# Patient Record
Sex: MALE | Race: WHITE | NOT HISPANIC OR LATINO | Employment: FULL TIME | URBAN - METROPOLITAN AREA
[De-identification: names, ages, dates, MRNs, and addresses within clinical notes are randomized per-mention and may not be internally consistent; named-entity substitution may affect disease eponyms.]

---

## 2018-10-02 ENCOUNTER — OFFICE VISIT (OUTPATIENT)
Dept: FAMILY MEDICINE | Facility: CLINIC | Age: 33
End: 2018-10-02
Payer: COMMERCIAL

## 2018-10-02 VITALS
SYSTOLIC BLOOD PRESSURE: 124 MMHG | HEIGHT: 69 IN | DIASTOLIC BLOOD PRESSURE: 72 MMHG | HEART RATE: 63 BPM | WEIGHT: 187.88 LBS | BODY MASS INDEX: 27.83 KG/M2 | OXYGEN SATURATION: 99 % | TEMPERATURE: 97 F

## 2018-10-02 DIAGNOSIS — R53.83 FATIGUE, UNSPECIFIED TYPE: ICD-10-CM

## 2018-10-02 DIAGNOSIS — A04.8 H. PYLORI INFECTION: Primary | ICD-10-CM

## 2018-10-02 PROCEDURE — 99204 OFFICE O/P NEW MOD 45 MIN: CPT | Mod: ,,, | Performed by: FAMILY MEDICINE

## 2018-10-02 PROCEDURE — 3008F BODY MASS INDEX DOCD: CPT | Mod: ,,, | Performed by: FAMILY MEDICINE

## 2018-10-02 RX ORDER — PANTOPRAZOLE SODIUM 20 MG/1
20 TABLET, DELAYED RELEASE ORAL DAILY
Qty: 30 TABLET | Refills: 11 | Status: SHIPPED | OUTPATIENT
Start: 2018-10-02 | End: 2019-11-01 | Stop reason: SDUPTHER

## 2018-10-02 NOTE — PROGRESS NOTES
SUBJECTIVE:    Patient ID: Tera Antonio is a 33 y.o. male.    Chief Complaint: Annual Exam (requesting labs include testostrone); Fatigue; and gas and heartburn    33-year-old male new to this provider prevents to clinic today for an annual exam, and to establish care with a new provider no past medical records to review. Patient today presents complaining of fatigue he thinks he has low testosterone (high previously been giving himself testosterone injections that were not prescribed), he also relates having heartburn.    Fatigue: Patient states that he has a lot of sleepiness he can lay down at any point and sleep. His normal bedtime is midnight he wakes up at 6 AM, he works all day comes home patient states if he lays down he can fall asleep but then he goes to the gym at 7:00 at night for which she takes a pre-workout supplement that involves stimulants he works out for 2 hours comes home after 8:00 at night and does not go to bed until midnight. Patient's complaint of fatigue sounds more like sleep deprivation is not complaining of daytime sleepiness while at work or driving, he's not complaining of any weakness, discussed sleep hygiene. Patient is concerned about his testosterone level because  he has done several cycles of anabolic steroids, and now thinks his testosterone level may be low because of that.    Patient previously treated for H. pylori, I review his labs showed a positive IgG several years ago he states he took medications symptoms resolved but now returned, he's never been tested for cure for H. pylori stool antigen, patient states after each breakfast when he gets to work he often has heartburn and abdominal pain, patient also relates sometimes waking up in the morning with abdominal pain and a funny taste in his mouth. Patient denies any melena or hematochezia.    Fatigue   This is a new problem. The current episode started more than 1 month ago. The problem occurs daily. The problem  has been unchanged. Associated symptoms include fatigue. Pertinent negatives include no abdominal pain, anorexia, arthralgias, change in bowel habit, chest pain, chills, congestion, coughing, diaphoresis, fever, headaches, joint swelling, myalgias, nausea, neck pain, numbness, rash, sore throat, swollen glands, urinary symptoms, vertigo, visual change, vomiting or weakness. Nothing aggravates the symptoms. He has tried nothing for the symptoms.   Gastroesophageal Reflux   He complains of belching and heartburn. He reports no abdominal pain, no chest pain, no choking, no coughing, no dysphagia, no early satiety, no globus sensation, no hoarse voice, no nausea, no sore throat, no stridor, no tooth decay, no water brash or no wheezing. This is a chronic problem. The current episode started more than 1 year ago. The problem occurs frequently. The problem has been unchanged. The heartburn duration is several minutes. The heartburn is located in the substernum and abdomen. The heartburn is of moderate intensity. The heartburn does not wake him from sleep. The heartburn does not limit his activity. The heartburn doesn't change with position. The symptoms are aggravated by caffeine, certain foods and lying down. Associated symptoms include fatigue. Pertinent negatives include no anemia, melena, muscle weakness, orthopnea or weight loss. He has tried an antacid and an antibiotic for the symptoms. The treatment provided mild relief.     History reviewed. No pertinent past medical history.  Social History     Socioeconomic History    Marital status:      Spouse name: Not on file    Number of children: Not on file    Years of education: Not on file    Highest education level: Not on file   Social Needs    Financial resource strain: Not on file    Food insecurity - worry: Not on file    Food insecurity - inability: Not on file    Transportation needs - medical: Not on file    Transportation needs - non-medical:  Not on file   Occupational History    Not on file   Tobacco Use    Smoking status: Never Smoker    Smokeless tobacco: Never Used   Substance and Sexual Activity    Alcohol use: No     Frequency: Never    Drug use: No    Sexual activity: Yes     Partners: Female   Other Topics Concern    Not on file   Social History Narrative    Not on file     History reviewed. No pertinent surgical history.  Family History   Problem Relation Age of Onset    No Known Problems Mother     Heart disease Father     Hyperlipidemia Father     Hypertension Father      Current Outpatient Medications   Medication Sig Dispense Refill    pantoprazole (PROTONIX) 20 MG tablet Take 1 tablet (20 mg total) by mouth once daily. 30 tablet 11     No current facility-administered medications for this visit.      Review of patient's allergies indicates:   Allergen Reactions    Eggs [egg derived] Itching       Review of Systems   Constitutional: Positive for fatigue. Negative for activity change, appetite change, chills, diaphoresis, fever, unexpected weight change and weight loss.   HENT: Negative for congestion, dental problem, ear pain, hoarse voice, postnasal drip, rhinorrhea, sinus pressure, sinus pain, sore throat, trouble swallowing and voice change.    Eyes: Negative for pain, discharge and itching.   Respiratory: Negative for cough, choking, chest tightness, shortness of breath and wheezing.    Cardiovascular: Negative for chest pain and palpitations.   Gastrointestinal: Positive for heartburn. Negative for abdominal distention, abdominal pain, anorexia, blood in stool, change in bowel habit, constipation, diarrhea, dysphagia, melena, nausea, rectal pain and vomiting.   Genitourinary: Negative for difficulty urinating, enuresis, hematuria and urgency.   Musculoskeletal: Negative for arthralgias, joint swelling, myalgias, muscle weakness and neck pain.   Skin: Negative for rash.   Neurological: Negative for vertigo, weakness,  "numbness and headaches.          Blood pressure 124/72, pulse 63, temperature 97.2 °F (36.2 °C), height 5' 9" (1.753 m), weight 85.2 kg (187 lb 14.4 oz), SpO2 99 %. Body mass index is 27.75 kg/m².   Objective:      Physical Exam   Constitutional: He is oriented to person, place, and time. He appears well-developed and well-nourished. No distress.   HENT:   Head: Normocephalic and atraumatic.   Right Ear: External ear normal.   Left Ear: External ear normal.   Mouth/Throat: Oropharynx is clear and moist.   Eyes: Conjunctivae and EOM are normal. Pupils are equal, round, and reactive to light. Right eye exhibits no discharge. Left eye exhibits no discharge. No scleral icterus.   Cardiovascular: Normal rate, regular rhythm and normal heart sounds.   No murmur heard.  Pulmonary/Chest: Effort normal and breath sounds normal. No respiratory distress. He has no wheezes. He has no rales.   Abdominal: Soft. Bowel sounds are normal. He exhibits no distension. There is no tenderness. There is no guarding.   Neurological: He is alert and oriented to person, place, and time.   Skin: Skin is warm and dry. Capillary refill takes less than 2 seconds. No rash noted.   Psychiatric: He has a normal mood and affect.           Assessment:       1. H. pylori infection    2. Fatigue, unspecified type         Plan:           H. pylori infection  -     H. pylori antigen, stool; Future; Expected date: 10/02/2018  -     pantoprazole (PROTONIX) 20 MG tablet; Take 1 tablet (20 mg total) by mouth once daily.  Dispense: 30 tablet; Refill: 11  Previously diagnosed with H. pylori infection, patient not currently taking any medications, has not been tested for cure will get stool antigen to evaluate for possible reinfection with H. pylori or a complete treatment. Will start patient on Protonix 20 mg a day patient started taking daily to see if this resolves his heartburn symptoms while rechecking for his H. pylori. With a history of H. pylori and " continue symptoms if he returns symptomatic we'll consider upper GI    Fatigue, unspecified type  -     Testosterone, Total, Males; Future; Expected date: 10/02/2018  Patient with complaints of fatigue and some more like sleep deprivation instructed patient to try to get better earlier, instructed good sleep hygiene, recommend against using pre-workout if he is in a workout that late afternoon to help him sleep better, counseled against nonprescribed testosterone products, will check his testosterone informed him will not be replacing testosterone and less is out of range.

## 2018-10-03 LAB — TESTOST SERPL-MCNC: 593 NG/DL (ref 264–916)

## 2019-08-07 ENCOUNTER — HOSPITAL ENCOUNTER (EMERGENCY)
Facility: HOSPITAL | Age: 34
Discharge: HOME | End: 2019-08-07
Attending: EMERGENCY MEDICINE
Payer: COMMERCIAL

## 2019-08-07 VITALS
HEART RATE: 91 BPM | WEIGHT: 190 LBS | SYSTOLIC BLOOD PRESSURE: 147 MMHG | DIASTOLIC BLOOD PRESSURE: 98 MMHG | BODY MASS INDEX: 28.14 KG/M2 | OXYGEN SATURATION: 98 % | RESPIRATION RATE: 20 BRPM | TEMPERATURE: 98 F | HEIGHT: 69 IN

## 2019-08-07 DIAGNOSIS — N20.0 KIDNEY STONE: Primary | ICD-10-CM

## 2019-08-07 LAB
BACTERIA #/AREA URNS HPF: ABNORMAL /HPF
BILIRUB UR QL STRIP: NEGATIVE
CLARITY UR: CLEAR
COLOR UR: YELLOW
GLUCOSE UR QL STRIP: NEGATIVE
HGB UR QL STRIP: ABNORMAL
KETONES UR QL STRIP: NEGATIVE
LEUKOCYTE ESTERASE UR QL STRIP: NEGATIVE
MICROSCOPIC COMMENT: ABNORMAL
NITRITE UR QL STRIP: NEGATIVE
PH UR STRIP: 6 [PH] (ref 5–8)
PROT UR QL STRIP: ABNORMAL
RBC #/AREA URNS HPF: 7 /HPF (ref 0–4)
SP GR UR STRIP: >=1.03 (ref 1–1.03)
SQUAMOUS #/AREA URNS HPF: ABNORMAL /HPF
URN SPEC COLLECT METH UR: ABNORMAL
UROBILINOGEN UR STRIP-ACNC: NEGATIVE EU/DL
WBC #/AREA URNS HPF: 2 /HPF (ref 0–5)

## 2019-08-07 PROCEDURE — 74176 CT RENAL STONE STUDY ABD PELVIS WO: ICD-10-PCS | Mod: 26,,, | Performed by: RADIOLOGY

## 2019-08-07 PROCEDURE — 74176 CT ABD & PELVIS W/O CONTRAST: CPT | Mod: 26,,, | Performed by: RADIOLOGY

## 2019-08-07 PROCEDURE — 63600175 PHARM REV CODE 636 W HCPCS: Performed by: NURSE PRACTITIONER

## 2019-08-07 PROCEDURE — 81000 URINALYSIS NONAUTO W/SCOPE: CPT

## 2019-08-07 PROCEDURE — 74176 CT ABD & PELVIS W/O CONTRAST: CPT | Mod: TC

## 2019-08-07 PROCEDURE — 96372 THER/PROPH/DIAG INJ SC/IM: CPT

## 2019-08-07 PROCEDURE — 99284 EMERGENCY DEPT VISIT MOD MDM: CPT | Mod: 25

## 2019-08-07 RX ORDER — KETOROLAC TROMETHAMINE 10 MG/1
10 TABLET, FILM COATED ORAL EVERY 6 HOURS PRN
Qty: 25 TABLET | Refills: 0 | Status: SHIPPED | OUTPATIENT
Start: 2019-08-07 | End: 2019-08-17

## 2019-08-07 RX ORDER — KETOROLAC TROMETHAMINE 30 MG/ML
60 INJECTION, SOLUTION INTRAMUSCULAR; INTRAVENOUS
Status: COMPLETED | OUTPATIENT
Start: 2019-08-07 | End: 2019-08-07

## 2019-08-07 RX ORDER — HYDROCODONE BITARTRATE AND ACETAMINOPHEN 5; 325 MG/1; MG/1
1 TABLET ORAL EVERY 4 HOURS PRN
Qty: 10 TABLET | Refills: 0 | Status: SHIPPED | OUTPATIENT
Start: 2019-08-07 | End: 2019-08-10

## 2019-08-07 RX ORDER — MORPHINE SULFATE 4 MG/ML
4 INJECTION, SOLUTION INTRAMUSCULAR; INTRAVENOUS
Status: DISCONTINUED | OUTPATIENT
Start: 2019-08-07 | End: 2019-08-07 | Stop reason: HOSPADM

## 2019-08-07 RX ADMIN — KETOROLAC TROMETHAMINE 60 MG: 30 INJECTION, SOLUTION INTRAMUSCULAR; INTRAVENOUS at 02:08

## 2019-08-07 NOTE — ED NOTES
Pt d/c and medication instructions reviewed and pt verbalized understanding. Pt ambulatory at d/c and escorted to registration.

## 2019-08-13 NOTE — ED PROVIDER NOTES
Encounter Date: 8/7/2019       History     Chief Complaint   Patient presents with    Flank Pain     left     Tera Antonio is a 34 y.o male with PMHx including GERD. He presents to ED with complaint of sudden onset left flank pain since last night    Patient reports gradual onset left flank pain. He now reports sharp, sever pain to left flank. Pain does not radiate to abdomin    He denies abdominal pain. No N/V/D. He is tolerating fluids.     No fever, body aches or chills    No urinary symptoms or hematuria              Review of patient's allergies indicates:   Allergen Reactions    Eggs [egg derived] Itching     Past Medical History:   Diagnosis Date    GERD (gastroesophageal reflux disease)      History reviewed. No pertinent surgical history.  Family History   Problem Relation Age of Onset    No Known Problems Mother     Heart disease Father     Hyperlipidemia Father     Hypertension Father      Social History     Tobacco Use    Smoking status: Never Smoker    Smokeless tobacco: Never Used   Substance Use Topics    Alcohol use: No     Frequency: Never    Drug use: No     Review of Systems   Constitutional: Negative.  Negative for fever.   HENT: Negative.  Negative for sore throat.    Eyes: Negative.    Respiratory: Negative.  Negative for shortness of breath.    Cardiovascular: Negative.  Negative for chest pain.   Gastrointestinal: Negative for nausea.   Endocrine: Negative.    Genitourinary: Positive for flank pain. Negative for decreased urine volume, discharge, dysuria, frequency, penile pain, penile swelling, scrotal swelling, testicular pain and urgency.   Musculoskeletal: Negative for back pain.   Skin: Negative for rash.   Neurological: Negative.  Negative for weakness.   Hematological: Negative.  Does not bruise/bleed easily.   Psychiatric/Behavioral: Negative.    All other systems reviewed and are negative.      Physical Exam     Initial Vitals [08/07/19 1359]   BP Pulse Resp Temp SpO2    (!) 147/98 91 20 97.9 °F (36.6 °C) 98 %      MAP       --         Physical Exam    Nursing note and vitals reviewed.  Constitutional: He appears well-developed and well-nourished.   HENT:   Head: Normocephalic.   Eyes: Pupils are equal, round, and reactive to light.   Neck: Normal range of motion.   Cardiovascular: Normal rate and regular rhythm.   Pulmonary/Chest: Breath sounds normal.   Abdominal: Soft. Bowel sounds are normal. He exhibits no distension. There is no tenderness. There is no rebound and no guarding.   Neurological: He is alert and oriented to person, place, and time.   Skin: Skin is warm and dry.   Psychiatric: He has a normal mood and affect. His behavior is normal. Judgment and thought content normal.         ED Course   Procedures  Labs Reviewed   URINALYSIS, REFLEX TO URINE CULTURE - Abnormal; Notable for the following components:       Result Value    Specific Gravity, UA >=1.030 (*)     Protein, UA Trace (*)     Occult Blood UA 2+ (*)     All other components within normal limits    Narrative:     Preferred Collection Type->Urine, Clean Catch   URINALYSIS MICROSCOPIC - Abnormal; Notable for the following components:    RBC, UA 7 (*)     All other components within normal limits    Narrative:     Preferred Collection Type->Urine, Clean Catch          Imaging Results          CT Renal Stone Study ABD Pelvis WO (Final result)  Result time 08/07/19 14:55:58    Final result by Hunter Beebe MD (08/07/19 14:55:58)                 Impression:      1. Small nonobstructing right renal calculus.  2. Small fat containing umbilical hernia.      Electronically signed by: Hunter Beebe  Date:    08/07/2019  Time:    14:55             Narrative:    EXAMINATION:  CT RENAL STONE STUDY ABD PELVIS WO    CLINICAL HISTORY:  Flank pain, stone disease suspected;    TECHNIQUE:  Low dose axial images, sagittal and coronal reformations were obtained from the lung bases to the pubic symphysis.  Contrast was not  administered.    COMPARISON:  None    FINDINGS:  The lung bases are clear.  No pleural or pericardial effusions.    The liver and spleen are normal in size and attenuation.  The gallbladder, pancreas and adrenal glands are unremarkable.    Kidneys are normal in size and attenuation.  Small 4 mm nonobstructing right renal calculus.  No left renal calculi.  No changes of hydronephrosis.  No perinephric inflammatory change.    Air and stool throughout the loops of colon.  No mesenteric inflammatory change.  No CT evidence for bowel obstruction.  Appendix is normal in caliber.    The bladder is only partially distended.  Prostate, seminal vesicles and rectum unremarkable.    No significant mesenteric or retroperitoneal lymphadenopathy.    Small fat containing umbilical hernia.                                 Medical Decision Making:   Initial Assessment:   Patient with complaint of sudden onset left flank pain since last night    Patient reports gradual onset left flank pain. He now reports sharp, sever pain to left flank. Pain does not radiate to abdomin    He denies abdominal pain. No N/V/D. He is tolerating fluids.     No fever, body aches or chills    No urinary symptoms or hematuria          Differential Diagnosis:   UTI, STD, kidney stone, kidney infection  ED Management:  Urine with 2+ occult blood    CT abdomin with small non-obstructing right renal calculi    Discussed physical exam findings with patient  No acute emergent medical condition identified at this time to warrant further testing/diagnostics  At this time, I believe the patient is clinically stable for discharge.   Patient to follow up with PCP in 1-2 days.  The patient acknowledges that close follow up with a MD is required after all ER visits  Pt given instructions; take all medications prescribed in the ER as directed.   Patient agrees to comply with all instruction and direction given in the ER  Pt agrees to return to ER if any symptoms  reoccur                               Clinical Impression:       ICD-10-CM ICD-9-CM   1. Kidney stone N20.0 592.0                                Bobbi Santos NP  08/13/19 4642

## 2019-09-08 ENCOUNTER — HOSPITAL ENCOUNTER (EMERGENCY)
Facility: HOSPITAL | Age: 34
Discharge: HOME | End: 2019-09-08
Attending: EMERGENCY MEDICINE
Payer: COMMERCIAL

## 2019-09-08 VITALS
SYSTOLIC BLOOD PRESSURE: 147 MMHG | HEART RATE: 72 BPM | DIASTOLIC BLOOD PRESSURE: 83 MMHG | BODY MASS INDEX: 29.24 KG/M2 | OXYGEN SATURATION: 99 % | TEMPERATURE: 98 F | WEIGHT: 198 LBS | RESPIRATION RATE: 16 BRPM

## 2019-09-08 DIAGNOSIS — S91.339A PUNCTURE WOUND OF FOOT: ICD-10-CM

## 2019-09-08 PROCEDURE — 99284 EMERGENCY DEPT VISIT MOD MDM: CPT | Mod: 25

## 2019-09-08 PROCEDURE — 25000003 PHARM REV CODE 250: Performed by: EMERGENCY MEDICINE

## 2019-09-08 PROCEDURE — 90715 TDAP VACCINE 7 YRS/> IM: CPT | Performed by: EMERGENCY MEDICINE

## 2019-09-08 PROCEDURE — 63600175 PHARM REV CODE 636 W HCPCS: Performed by: EMERGENCY MEDICINE

## 2019-09-08 PROCEDURE — 90471 IMMUNIZATION ADMIN: CPT | Performed by: EMERGENCY MEDICINE

## 2019-09-08 RX ORDER — CIPROFLOXACIN 500 MG/1
500 TABLET ORAL 2 TIMES DAILY
Qty: 20 TABLET | Refills: 0 | Status: SHIPPED | OUTPATIENT
Start: 2019-09-08 | End: 2019-09-08 | Stop reason: SDUPTHER

## 2019-09-08 RX ORDER — CIPROFLOXACIN 500 MG/1
500 TABLET ORAL 2 TIMES DAILY
Qty: 20 TABLET | Refills: 0 | Status: SHIPPED | OUTPATIENT
Start: 2019-09-08 | End: 2019-09-18

## 2019-09-08 RX ORDER — CIPROFLOXACIN 500 MG/1
500 TABLET ORAL
Status: COMPLETED | OUTPATIENT
Start: 2019-09-08 | End: 2019-09-08

## 2019-09-08 RX ADMIN — CIPROFLOXACIN HYDROCHLORIDE 500 MG: 500 TABLET, FILM COATED ORAL at 08:09

## 2019-09-08 RX ADMIN — CLOSTRIDIUM TETANI TOXOID ANTIGEN (FORMALDEHYDE INACTIVATED), CORYNEBACTERIUM DIPHTHERIAE TOXOID ANTIGEN (FORMALDEHYDE INACTIVATED), BORDETELLA PERTUSSIS TOXOID ANTIGEN (GLUTARALDEHYDE INACTIVATED), BORDETELLA PERTUSSIS FILAMENTOUS HEMAGGLUTININ ANTIGEN (FORMALDEHYDE INACTIVATED), BORDETELLA PERTUSSIS PERTACTIN ANTIGEN, AND BORDETELLA PERTUSSIS FIMBRIAE 2/3 ANTIGEN 0.5 ML: 5; 2; 2.5; 5; 3; 5 INJECTION, SUSPENSION INTRAMUSCULAR at 08:09

## 2019-09-09 NOTE — ED NOTES
"Pt states he stepped on screws. He states he stepped on a screw went to hop off and stepped on another screw with the opposite foot. He hasn't had a tetanus shot in a long time. He states "years" and he was wearing flip flops when the incident happened. He has small pin size holes on the bottom of both feet. Pain 3/10  "

## 2019-09-09 NOTE — ED PROVIDER NOTES
Encounter Date: 9/8/2019       History 34-year-old male presents to the emergency department states that he was wearing flip-flop shoes when he stepped on screws to both of his feet.  Patient has puncture wounds to the plantar surface of bilateral feet last tetanus unknown.  Denies foreign body sensation     Chief Complaint   Patient presents with    Foot Injury     stepped on a screw both feet tetanus shot over 10 yrs     HPI  Review of patient's allergies indicates:  No Known Allergies  Past Medical History:   Diagnosis Date    GERD (gastroesophageal reflux disease)      No past surgical history on file.  Family History   Problem Relation Age of Onset    No Known Problems Mother     Heart disease Father     Hyperlipidemia Father     Hypertension Father      Social History     Tobacco Use    Smoking status: Never Smoker    Smokeless tobacco: Never Used   Substance Use Topics    Alcohol use: No     Frequency: Never    Drug use: No     Review of Systems   Constitutional: Negative.    HENT: Negative.    Respiratory: Negative.    Cardiovascular: Negative.    Gastrointestinal: Negative.    Genitourinary: Negative.    Musculoskeletal: Negative.    Skin: Positive for wound.   Hematological: Negative.    Psychiatric/Behavioral: Negative.        Physical Exam     Initial Vitals [09/08/19 1935]   BP Pulse Resp Temp SpO2   (!) 141/78 76 16 98.4 °F (36.9 °C) 97 %      MAP       --         Physical Exam    Nursing note and vitals reviewed.  Constitutional: He appears well-developed and well-nourished.   HENT:   Head: Normocephalic and atraumatic.   Eyes: EOM are normal. Pupils are equal, round, and reactive to light.   Cardiovascular: Normal rate and regular rhythm.   Pulmonary/Chest: Breath sounds normal.   Abdominal: Soft. Bowel sounds are normal.   Musculoskeletal:        Left foot: There is tenderness.        Feet:    Puncture wounds to plantar surface of both feet no evidence of erythema surrounding no foreign  body sensation no evidence of lymphangitis.   Neurological: He is alert and oriented to person, place, and time.   Skin: Skin is warm. Capillary refill takes less than 2 seconds.   Puncture wound to the plantar surface of the right and left foot   Psychiatric: He has a normal mood and affect.         ED Course   Procedures  Labs Reviewed - No data to display       Imaging Results    None          Medical Decision Making:   Initial Assessment:   Puncture wound  Differential Diagnosis:   Plantar puncture wound  ED Management:  34-year-old male status post stepping on a nail with flip-flops on to both bilateral plantar surface of feet x-ray images reviewed no foreign body noted tetanus updated patient will be placed on prophylactic antibiotics.  He was given detailed return precautions                      Clinical Impression:       ICD-10-CM ICD-9-CM   1. Puncture wound of foot S91.339A 892.0                                Yaa Amado, TELMA  09/08/19 2130

## 2019-09-09 NOTE — DISCHARGE INSTRUCTIONS
Take antibiotics as directed  Motrin for pain  Return for any concerns or signs of infection  Follow-up as directed

## 2019-11-01 DIAGNOSIS — A04.8 H. PYLORI INFECTION: ICD-10-CM

## 2019-11-04 RX ORDER — PANTOPRAZOLE SODIUM 20 MG/1
TABLET, DELAYED RELEASE ORAL
Qty: 30 TABLET | Refills: 11 | Status: SHIPPED | OUTPATIENT
Start: 2019-11-04 | End: 2020-06-01 | Stop reason: ALTCHOICE

## 2020-01-22 ENCOUNTER — TELEPHONE (OUTPATIENT)
Dept: FAMILY MEDICINE | Facility: CLINIC | Age: 35
End: 2020-01-22

## 2020-01-23 ENCOUNTER — TELEPHONE (OUTPATIENT)
Dept: FAMILY MEDICINE | Facility: CLINIC | Age: 35
End: 2020-01-23

## 2020-01-23 NOTE — TELEPHONE ENCOUNTER
[facility] [place] [facility] -   Reached out to patient regarding:  Colorectal screen, A1c, DM eye exam.  Had to [facility]

## 2020-06-01 ENCOUNTER — OFFICE VISIT (OUTPATIENT)
Dept: FAMILY MEDICINE | Facility: CLINIC | Age: 35
End: 2020-06-01
Payer: COMMERCIAL

## 2020-06-01 VITALS
TEMPERATURE: 98 F | HEIGHT: 69 IN | RESPIRATION RATE: 16 BRPM | WEIGHT: 187 LBS | DIASTOLIC BLOOD PRESSURE: 80 MMHG | OXYGEN SATURATION: 98 % | HEART RATE: 83 BPM | SYSTOLIC BLOOD PRESSURE: 130 MMHG | BODY MASS INDEX: 27.7 KG/M2

## 2020-06-01 DIAGNOSIS — R53.82 CHRONIC FATIGUE: Primary | ICD-10-CM

## 2020-06-01 DIAGNOSIS — K21.9 GASTROESOPHAGEAL REFLUX DISEASE WITHOUT ESOPHAGITIS: ICD-10-CM

## 2020-06-01 DIAGNOSIS — Z13.220 ENCOUNTER FOR LIPID SCREENING FOR CARDIOVASCULAR DISEASE: ICD-10-CM

## 2020-06-01 DIAGNOSIS — Z13.6 ENCOUNTER FOR LIPID SCREENING FOR CARDIOVASCULAR DISEASE: ICD-10-CM

## 2020-06-01 PROCEDURE — 99213 PR OFFICE/OUTPT VISIT, EST, LEVL III, 20-29 MIN: ICD-10-PCS | Mod: S$GLB,,, | Performed by: FAMILY MEDICINE

## 2020-06-01 PROCEDURE — 3008F PR BODY MASS INDEX (BMI) DOCUMENTED: ICD-10-PCS | Mod: S$GLB,,, | Performed by: FAMILY MEDICINE

## 2020-06-01 PROCEDURE — 3008F BODY MASS INDEX DOCD: CPT | Mod: S$GLB,,, | Performed by: FAMILY MEDICINE

## 2020-06-01 PROCEDURE — 99213 OFFICE O/P EST LOW 20 MIN: CPT | Mod: S$GLB,,, | Performed by: FAMILY MEDICINE

## 2020-06-01 RX ORDER — PANTOPRAZOLE SODIUM 40 MG/1
40 TABLET, DELAYED RELEASE ORAL DAILY
Qty: 30 TABLET | Refills: 11 | Status: SHIPPED | OUTPATIENT
Start: 2020-06-01 | End: 2021-01-11 | Stop reason: ALTCHOICE

## 2020-06-01 NOTE — PROGRESS NOTES
SUBJECTIVE:    Patient ID: Tera Antonio is a 35 y.o. male.    Chief Complaint: GI Problem (gas pain/upset stomach every morning) and Fatigue      36 yo male who has not been seen since 2018. He is here today complaining about having an upset stomach, funny taste in his mouth, for several months, patient states that he wakes up and his appetite is decreased. He has a hx of heart burn on 20mg pantoprazole, with has been treating his heart burn.    He is also complaining feeling chronically tired, he complained of the same issue 2 years ago. Pt states that he goes to bed around midnight and sleeps until 6am, he feels chronically tired and has not worked out in 6 months because of the fatigue. He states that he could fall asleep in the middle of the day if he is sitting too long.\    Smoke: None  ETOH: None  Exercise: Has not worked out in 6 months.    Fatigue   This is a chronic problem. The current episode started more than 1 month ago. The problem occurs daily. The problem has been unchanged. Associated symptoms include fatigue. Pertinent negatives include no abdominal pain, anorexia, arthralgias, change in bowel habit, chest pain, chills, congestion, coughing, diaphoresis, fever, headaches, joint swelling, myalgias, nausea, neck pain, numbness, rash, sore throat, swollen glands, urinary symptoms, vertigo, visual change, vomiting or weakness. Exacerbated by: lack of sleep. He has tried nothing for the symptoms. The treatment provided no relief.   Gastroesophageal Reflux   He reports no abdominal pain, no chest pain, no coughing, no nausea, no sore throat or no wheezing. This is a chronic problem. The current episode started more than 1 year ago. The problem occurs occasionally. The problem has been waxing and waning. Associated symptoms include fatigue. He has tried a PPI for the symptoms. The treatment provided mild relief.         Past Medical History:   Diagnosis Date    GERD (gastroesophageal reflux  disease)      Social History     Socioeconomic History    Marital status:      Spouse name: Not on file    Number of children: Not on file    Years of education: Not on file    Highest education level: Not on file   Occupational History    Not on file   Social Needs    Financial resource strain: Not on file    Food insecurity:     Worry: Not on file     Inability: Not on file    Transportation needs:     Medical: Not on file     Non-medical: Not on file   Tobacco Use    Smoking status: Never Smoker    Smokeless tobacco: Never Used   Substance and Sexual Activity    Alcohol use: No     Frequency: Never    Drug use: No    Sexual activity: Yes     Partners: Female   Lifestyle    Physical activity:     Days per week: Not on file     Minutes per session: Not on file    Stress: Only a little   Relationships    Social connections:     Talks on phone: Not on file     Gets together: Not on file     Attends Confucianist service: Not on file     Active member of club or organization: Not on file     Attends meetings of clubs or organizations: Not on file     Relationship status: Not on file   Other Topics Concern    Not on file   Social History Narrative    Not on file     History reviewed. No pertinent surgical history.  Family History   Problem Relation Age of Onset    No Known Problems Mother     Heart disease Father     Hyperlipidemia Father     Hypertension Father      Current Outpatient Medications   Medication Sig Dispense Refill    pantoprazole (PROTONIX) 40 MG tablet Take 1 tablet (40 mg total) by mouth once daily. 30 tablet 11     No current facility-administered medications for this visit.      Review of patient's allergies indicates:  No Known Allergies    Review of Systems   Constitutional: Positive for fatigue. Negative for chills, diaphoresis and fever.   HENT: Negative for congestion, sinus pressure, sinus pain, sneezing and sore throat.    Respiratory: Negative for cough, chest  "tightness, shortness of breath and wheezing.    Cardiovascular: Negative for chest pain, palpitations and leg swelling.   Gastrointestinal: Negative for abdominal distention, abdominal pain, anorexia, blood in stool, change in bowel habit, constipation, nausea and vomiting.   Musculoskeletal: Negative for arthralgias, joint swelling, myalgias and neck pain.   Skin: Negative for rash.   Neurological: Negative for vertigo, weakness, numbness and headaches.          Blood pressure 130/80, pulse 83, temperature 98 °F (36.7 °C), temperature source Oral, resp. rate 16, height 5' 9" (1.753 m), weight 84.8 kg (187 lb), SpO2 98 %. Body mass index is 27.62 kg/m².   Objective:      Physical Exam   Constitutional: He is oriented to person, place, and time. He appears well-developed and well-nourished. No distress.   HENT:   Head: Normocephalic and atraumatic.   Right Ear: External ear normal.   Left Ear: External ear normal.   Mouth/Throat: Oropharynx is clear and moist.   Eyes: Pupils are equal, round, and reactive to light. Conjunctivae are normal. Right eye exhibits no discharge. Left eye exhibits no discharge.   Neck: Normal range of motion. Neck supple. No thyromegaly present.   Cardiovascular: Normal rate, regular rhythm and normal heart sounds.   No murmur heard.  Pulmonary/Chest: Effort normal and breath sounds normal.   Lymphadenopathy:     He has no cervical adenopathy.   Neurological: He is alert and oriented to person, place, and time.   Skin: Skin is warm and dry. He is not diaphoretic.           Assessment:       1. Chronic fatigue    2. Encounter for lipid screening for cardiovascular disease    3. Gastroesophageal reflux disease without esophagitis         Plan:           Chronic fatigue  -     CBC auto differential; Future; Expected date: 06/01/2020  -     Cancel: Comprehensive metabolic panel; Future; Expected date: 06/01/2020  -     Cancel: TSH; Future; Expected date: 06/01/2020  -     Testosterone, Total, " Males; Future; Expected date: 06/01/2020  -     TSH; Future; Expected date: 06/01/2020  -     Comprehensive metabolic panel; Future; Expected date: 06/01/2020  Pt complaining of chronic fatigue I have asked that he attempt to get 8 hour of sleep routinly, (pt states when he gets 8 hours of sleep he feels more fatigued) I will get routine screening to look for other possible causes of fatigue.    Encounter for lipid screening for cardiovascular disease  -     Cancel: Lipid Panel; Future; Expected date: 06/01/2020  -     Lipid Panel; Future; Expected date: 06/01/2020  Routine    Gastroesophageal reflux disease without esophagitis  -     pantoprazole (PROTONIX) 40 MG tablet; Take 1 tablet (40 mg total) by mouth once daily.  Dispense: 30 tablet; Refill: 11  Will increase his pantoprazole to see if this resolves his morning symptoms.

## 2020-06-01 NOTE — LETTER
June 1, 2020      [facility]  [address]  [facility] [address]  Phone: [phone]  Fax: [phone]       Patient: Tera Antonio   YOB: 1985  Date of Visit: 06/01/2020    To Whom It May Concern:    Esha Antonio  was at [facility] on 06/01/2020. He may return to work/school on 06/02/2020 with no restrictions. Also, please excuse him on the following dates as well 05/29/2020 to 05/31/2020  If you have any questions or concerns, or if I can be of further assistance, please do not hesitate to contact me.    Sincerely,        CC: Anette pinto

## 2020-06-14 LAB
ALBUMIN SERPL-MCNC: 4.4 G/DL (ref 4–5)
ALBUMIN/GLOB SERPL: 1.6 {RATIO} (ref 1.2–2.2)
ALP SERPL-CCNC: 67 IU/L (ref 39–117)
ALT SERPL-CCNC: 17 IU/L (ref 0–44)
AST SERPL-CCNC: 20 IU/L (ref 0–40)
BASOPHILS # BLD AUTO: 0 X10E3/UL (ref 0–0.2)
BASOPHILS NFR BLD AUTO: 1 %
BILIRUB SERPL-MCNC: 0.5 MG/DL (ref 0–1.2)
BUN SERPL-MCNC: 14 MG/DL (ref 6–20)
BUN/CREAT SERPL: 14 (ref 9–20)
CALCIUM SERPL-MCNC: 9.3 MG/DL (ref 8.7–10.2)
CHLORIDE SERPL-SCNC: 102 MMOL/L (ref 96–106)
CHOLEST SERPL-MCNC: 196 MG/DL (ref 100–199)
CO2 SERPL-SCNC: 26 MMOL/L (ref 20–29)
CREAT SERPL-MCNC: 1.02 MG/DL (ref 0.76–1.27)
EOSINOPHIL # BLD AUTO: 0.1 X10E3/UL (ref 0–0.4)
EOSINOPHIL NFR BLD AUTO: 1 %
ERYTHROCYTE [DISTWIDTH] IN BLOOD BY AUTOMATED COUNT: 12 % (ref 11.6–15.4)
GLOBULIN SER CALC-MCNC: 2.7 G/DL (ref 1.5–4.5)
GLUCOSE SERPL-MCNC: 90 MG/DL (ref 65–99)
HCT VFR BLD AUTO: 44.4 % (ref 37.5–51)
HDLC SERPL-MCNC: 41 MG/DL
HGB BLD-MCNC: 15.3 G/DL (ref 13–17.7)
IMM GRANULOCYTES # BLD AUTO: 0 X10E3/UL (ref 0–0.1)
IMM GRANULOCYTES NFR BLD AUTO: 0 %
LDLC SERPL CALC-MCNC: 144 MG/DL (ref 0–99)
LYMPHOCYTES # BLD AUTO: 1.8 X10E3/UL (ref 0.7–3.1)
LYMPHOCYTES NFR BLD AUTO: 39 %
MCH RBC QN AUTO: 29.8 PG (ref 26.6–33)
MCHC RBC AUTO-ENTMCNC: 34.5 G/DL (ref 31.5–35.7)
MCV RBC AUTO: 86 FL (ref 79–97)
MONOCYTES # BLD AUTO: 0.4 X10E3/UL (ref 0.1–0.9)
MONOCYTES NFR BLD AUTO: 8 %
NEUTROPHILS # BLD AUTO: 2.3 X10E3/UL (ref 1.4–7)
NEUTROPHILS NFR BLD AUTO: 51 %
PLATELET # BLD AUTO: 250 X10E3/UL (ref 150–450)
POTASSIUM SERPL-SCNC: 4.3 MMOL/L (ref 3.5–5.2)
PROT SERPL-MCNC: 7.1 G/DL (ref 6–8.5)
RBC # BLD AUTO: 5.14 X10E6/UL (ref 4.14–5.8)
SODIUM SERPL-SCNC: 139 MMOL/L (ref 134–144)
TESTOST SERPL-MCNC: 493.8 NG/DL (ref 264–916)
TRIGL SERPL-MCNC: 56 MG/DL (ref 0–149)
TSH SERPL DL<=0.005 MIU/L-ACNC: 3.02 UIU/ML (ref 0.45–4.5)
VLDLC SERPL CALC-MCNC: 11 MG/DL (ref 5–40)
WBC # BLD AUTO: 4.6 X10E3/UL (ref 3.4–10.8)

## 2020-06-16 ENCOUNTER — TELEPHONE (OUTPATIENT)
Dept: FAMILY MEDICINE | Facility: CLINIC | Age: 35
End: 2020-06-16

## 2020-06-16 NOTE — TELEPHONE ENCOUNTER
----- Message from RESHMA Kwong sent at 6/15/2020  6:32 PM CDT -----  LDL is elevated; otherwise, labs look OK; keep appt with Dr. Phillips to review all.

## 2020-06-30 ENCOUNTER — OFFICE VISIT (OUTPATIENT)
Dept: FAMILY MEDICINE | Facility: CLINIC | Age: 35
End: 2020-06-30
Payer: COMMERCIAL

## 2020-06-30 VITALS
DIASTOLIC BLOOD PRESSURE: 70 MMHG | RESPIRATION RATE: 16 BRPM | OXYGEN SATURATION: 98 % | HEIGHT: 69 IN | SYSTOLIC BLOOD PRESSURE: 120 MMHG | TEMPERATURE: 98 F | WEIGHT: 187 LBS | HEART RATE: 66 BPM | BODY MASS INDEX: 27.7 KG/M2

## 2020-06-30 DIAGNOSIS — K30 UPSET STOMACH: ICD-10-CM

## 2020-06-30 DIAGNOSIS — K21.9 GASTROESOPHAGEAL REFLUX DISEASE WITHOUT ESOPHAGITIS: Primary | ICD-10-CM

## 2020-06-30 PROCEDURE — 3008F PR BODY MASS INDEX (BMI) DOCUMENTED: ICD-10-PCS | Mod: S$GLB,,, | Performed by: FAMILY MEDICINE

## 2020-06-30 PROCEDURE — 99213 PR OFFICE/OUTPT VISIT, EST, LEVL III, 20-29 MIN: ICD-10-PCS | Mod: S$GLB,,, | Performed by: FAMILY MEDICINE

## 2020-06-30 PROCEDURE — 99213 OFFICE O/P EST LOW 20 MIN: CPT | Mod: S$GLB,,, | Performed by: FAMILY MEDICINE

## 2020-06-30 PROCEDURE — 3008F BODY MASS INDEX DOCD: CPT | Mod: S$GLB,,, | Performed by: FAMILY MEDICINE

## 2020-06-30 RX ORDER — DICYCLOMINE HYDROCHLORIDE 20 MG/1
20 TABLET ORAL EVERY 6 HOURS
Qty: 120 TABLET | Refills: 0 | Status: SHIPPED | OUTPATIENT
Start: 2020-06-30 | End: 2020-07-30

## 2020-06-30 NOTE — PROGRESS NOTES
SUBJECTIVE:    Patient ID: Tera Antonio is a 35 y.o. male.    Chief Complaint: Fatigue and Abdominal Pain  34 yo male who was seen 1 month ago for upset stomach. He states that when he wakes up in the morning upset stomach, funny taste in his mouth, this has been ongoing for several months. Patient states that he wakes up and his appetite is decreased he states that he has an upset stomach but is not complaining of any pain. He states that if he drinks water it makes the symptoms worse, he does not eat breakfast and then when he gets to work he stomach upset increases and he has a bowel movement which relieves his stomach issues. He has a hx of heart burn  And had been on  20 mg pantoprazole, which has been treating his heart burn. Was started on Protonix 40mg once a day this did not resolve his symptoms of the funny taste in his mouth or AM upset stomach but it does manage his Heart burn symptoms.  He denies any Nausea or vomiting, no Melena, No BRBPR, his stools are loose but not watery.    At last visit he complained of feeling chronically tired, he complained of the same issue 2 years ago. Routine screening labs have been completed, his thyroid, Testosterone, and Hemoglobin and hematocrit have returned normal.        Smoke: None  ETOH: None  Exercise: Has not worked out in 6 months.  ASCVD risk : 1.5%    Labs completed for his complaint of fatigue:    Testosterone, Total, LC/.0 - 916.0 ng/dL 493.8      TSH 0.450 - 4.500 uIU/mL 3.020      Glucose 65 - 99 mg/dL 90      Hemoglobin 13.0 - 17.7 g/dL 15.3  15.3 R    Hematocrit 37.5 - 51.0 % 44.4  44.4 R      Cholesterol 100 - 199 mg/dL 196  179 R, CM    Triglycerides 0 - 149 mg/dL 56  82 R, CM    HDL >39 mg/dL 41  35Low  R, CM    VLDL Cholesterol Bi 5 - 40 mg/dL 11     LDL Calculated 0 - 99 mg/dL 144High        HPI      Past Medical History:   Diagnosis Date    GERD (gastroesophageal reflux disease)      Social History     Socioeconomic History     Marital status:      Spouse name: Not on file    Number of children: Not on file    Years of education: Not on file    Highest education level: Not on file   Occupational History    Not on file   Social Needs    Financial resource strain: Not on file    Food insecurity     Worry: Not on file     Inability: Not on file    Transportation needs     Medical: Not on file     Non-medical: Not on file   Tobacco Use    Smoking status: Never Smoker    Smokeless tobacco: Never Used   Substance and Sexual Activity    Alcohol use: No     Frequency: Never    Drug use: No    Sexual activity: Yes     Partners: Female   Lifestyle    Physical activity     Days per week: Not on file     Minutes per session: Not on file    Stress: Only a little   Relationships    Social connections     Talks on phone: Not on file     Gets together: Not on file     Attends [employer] service: Not on file     Active member of club or organization: Not on file     Attends meetings of clubs or organizations: Not on file     Relationship status: Not on file   Other Topics Concern    Not on file   Social History Narrative    Not on file     History reviewed. No pertinent surgical history.  Family History   Problem Relation Age of Onset    No Known Problems Mother     Heart disease Father     Hyperlipidemia Father     Hypertension Father      Current Outpatient Medications   Medication Sig Dispense Refill    pantoprazole (PROTONIX) 40 MG tablet Take 1 tablet (40 mg total) by mouth once daily. 30 tablet 11    dicyclomine (BENTYL) 20 mg tablet Take 1 tablet (20 mg total) by mouth every 6 (six) hours. 120 tablet 0     No current facility-administered medications for this visit.      Review of patient's allergies indicates:  No Known Allergies    Review of Systems   Constitutional: Positive for fatigue. Negative for activity change, appetite change and unexpected weight change.   HENT: Negative for congestion, postnasal drip,  "rhinorrhea, sinus pressure and sinus pain.    Respiratory: Negative for cough, chest tightness, shortness of breath and wheezing.    Cardiovascular: Negative for chest pain and palpitations.   Gastrointestinal: Positive for abdominal pain and diarrhea. Negative for abdominal distention, anal bleeding, blood in stool and constipation.          Blood pressure 120/70, pulse 66, temperature 98.3 °F (36.8 °C), temperature source Temporal, resp. rate 16, height 5' 9" (1.753 m), weight 84.8 kg (187 lb), SpO2 98 %. Body mass index is 27.62 kg/m².   Objective:      Physical Exam  Constitutional:       General: He is not in acute distress.     Appearance: Normal appearance. He is well-developed and normal weight.   HENT:      Head: Normocephalic and atraumatic.      Right Ear: External ear normal.      Left Ear: External ear normal.   Eyes:      General:         Right eye: No discharge.         Left eye: No discharge.      Conjunctiva/sclera: Conjunctivae normal.      Pupils: Pupils are equal, round, and reactive to light.   Cardiovascular:      Rate and Rhythm: Normal rate and regular rhythm.      Heart sounds: Normal heart sounds. No murmur.   Pulmonary:      Effort: Pulmonary effort is normal.      Breath sounds: Normal breath sounds. No wheezing.   Abdominal:      General: Abdomen is flat. There is no distension.      Palpations: Abdomen is soft. There is no mass.      Tenderness: There is no abdominal tenderness.      Hernia: No hernia is present.   Skin:     General: Skin is warm and dry.      Coloration: Skin is not jaundiced.   Neurological:      Mental Status: He is alert.             Assessment:       1. Gastroesophageal reflux disease without esophagitis    2. Upset stomach         Plan:           Gastroesophageal reflux disease without esophagitis  -     Ambulatory referral/consult to Gastroenterology; Future; Expected date: 07/07/2020  Pt is complaining of having an upset stomach in the morning the resolves by " noon, it is relieved with his morning bowel movement. The PPI is controlling his heart burn but he continues to have a foul taste in his mouth in the morning. I suspect that this may be secondary to Reflux, or possible IBS.    Upset stomach  -     dicyclomine (BENTYL) 20 mg tablet; Take 1 tablet (20 mg total) by mouth every 6 (six) hours.  Dispense: 120 tablet; Refill: 0

## 2020-08-05 DIAGNOSIS — R10.13 EPIGASTRIC PAIN: Primary | ICD-10-CM

## 2020-12-30 RX ORDER — DICYCLOMINE HYDROCHLORIDE 20 MG/1
20 TABLET ORAL DAILY
COMMUNITY

## 2020-12-31 ENCOUNTER — OFFICE VISIT (OUTPATIENT)
Dept: FAMILY MEDICINE | Facility: CLINIC | Age: 35
End: 2020-12-31
Payer: COMMERCIAL

## 2020-12-31 VITALS
BODY MASS INDEX: 27.85 KG/M2 | RESPIRATION RATE: 16 BRPM | DIASTOLIC BLOOD PRESSURE: 86 MMHG | SYSTOLIC BLOOD PRESSURE: 120 MMHG | TEMPERATURE: 98 F | HEART RATE: 67 BPM | OXYGEN SATURATION: 98 % | HEIGHT: 69 IN | WEIGHT: 188 LBS

## 2020-12-31 DIAGNOSIS — R53.82 CHRONIC FATIGUE: Primary | ICD-10-CM

## 2020-12-31 DIAGNOSIS — F41.9 ANXIETY: ICD-10-CM

## 2020-12-31 PROCEDURE — 1126F PR PAIN SEVERITY QUANTIFIED, NO PAIN PRESENT: ICD-10-PCS | Mod: S$GLB,,, | Performed by: FAMILY MEDICINE

## 2020-12-31 PROCEDURE — 3008F PR BODY MASS INDEX (BMI) DOCUMENTED: ICD-10-PCS | Mod: S$GLB,,, | Performed by: FAMILY MEDICINE

## 2020-12-31 PROCEDURE — 1126F AMNT PAIN NOTED NONE PRSNT: CPT | Mod: S$GLB,,, | Performed by: FAMILY MEDICINE

## 2020-12-31 PROCEDURE — 99213 OFFICE O/P EST LOW 20 MIN: CPT | Mod: S$GLB,,, | Performed by: FAMILY MEDICINE

## 2020-12-31 PROCEDURE — 3008F BODY MASS INDEX DOCD: CPT | Mod: S$GLB,,, | Performed by: FAMILY MEDICINE

## 2020-12-31 PROCEDURE — 99213 PR OFFICE/OUTPT VISIT, EST, LEVL III, 20-29 MIN: ICD-10-PCS | Mod: S$GLB,,, | Performed by: FAMILY MEDICINE

## 2020-12-31 RX ORDER — SERTRALINE HYDROCHLORIDE 50 MG/1
50 TABLET, FILM COATED ORAL DAILY
Qty: 30 TABLET | Refills: 11 | Status: SHIPPED | OUTPATIENT
Start: 2020-12-31 | End: 2021-12-31

## 2020-12-31 NOTE — PROGRESS NOTES
SUBJECTIVE:    Patient ID: Tera Antonio is a 35 y.o. male.    Chief Complaint: Fatigue (ongoing for the past few months, it has been getting worse)   35-year-old male returns to clinic with a chief complaint of worsening fatigue.  Of review of his records demonstrates that he has had this complaint since 2015, and has complained of fatigue at every visit since then.  He has been screen for anemia with normal CBC his comprehensive panel with normal his thyroid panel was normal his testosterone level was normal.  Patient states that he gets fatigued when at work and when he works out at the gym.  He does not get fatigued on the weekends on holidays when he does not have to go to work.  He reports he gets adequate sleep no history of sleep apnea, no history of snoring.     Patient denies any symptoms consistent with congestive heart failure no shortness of breath with activity no lower extremity swelling no orthopnea no postural nocturnal dyspnea.  No symptoms consistent with COPD, no shortness of breath no increased sputum production.  No history of renal disease or electrolyte abnormalities.  He has been screen for anemia.  No history of recent illness mononucleosis he has never been screened for hepatitis and HIV, no symptoms consistent with tuberculosis no nighttime fevers no chills no bloody sputum.    No neurologic complaints that would be consistent with multiple sclerosis.  He denies any benzodiazepine use no antidepressants not on a muscle relaxers or antihistamines, patient is not taking any beta blockers or opioids.  He denies alcohol use no marijuana use or any other illicit drug use.    ?Onset - Gradual  ?Course - Worsening  ?Duration and daily pattern, worsens throughout the day.  ?Factors that alleviate or exacerbate it, strenuous activity makes it worse.  ?Impact on daily life - Ability to work, and exercise    Smoke: None  ETOH: None  Exercise: Has not worked out in 6 months.    HPI      Past  Medical History:   Diagnosis Date    GERD (gastroesophageal reflux disease)      Social History     Socioeconomic History    Marital status:      Spouse name: Not on file    Number of children: Not on file    Years of education: Not on file    Highest education level: Not on file   Occupational History    Not on file   Social Needs    Financial resource strain: Not on file    Food insecurity     Worry: Not on file     Inability: Not on file    Transportation needs     Medical: Not on file     Non-medical: Not on file   Tobacco Use    Smoking status: Never Smoker    Smokeless tobacco: Never Used   Substance and Sexual Activity    Alcohol use: No     Frequency: Never    Drug use: No    Sexual activity: Yes     Partners: Female   Lifestyle    Physical activity     Days per week: Not on file     Minutes per session: Not on file    Stress: Only a little   Relationships    Social connections     Talks on phone: Not on file     Gets together: Not on file     Attends Yarsani service: Not on file     Active member of club or organization: Not on file     Attends meetings of clubs or organizations: Not on file     Relationship status: Not on file   Other Topics Concern    Not on file   Social History Narrative    Not on file     History reviewed. No pertinent surgical history.  Family History   Problem Relation Age of Onset    No Known Problems Mother     Heart disease Father     Hyperlipidemia Father     Hypertension Father      Current Outpatient Medications   Medication Sig Dispense Refill    dicyclomine (BENTYL) 20 mg tablet Take 20 mg by mouth Daily.      pantoprazole (PROTONIX) 40 MG tablet Take 1 tablet (40 mg total) by mouth once daily. 30 tablet 11    sertraline (ZOLOFT) 50 MG tablet Take 1 tablet (50 mg total) by mouth once daily. 30 tablet 11     No current facility-administered medications for this visit.      Review of patient's allergies indicates:  No Known Allergies    Review  "of Systems   Constitutional: Positive for fatigue. Negative for activity change, appetite change, chills, diaphoresis, fever and unexpected weight change.   HENT: Negative for congestion, postnasal drip, rhinorrhea, sinus pressure, sinus pain, sneezing and sore throat.    Eyes: Negative for photophobia, pain, redness and visual disturbance.   Respiratory: Negative for cough, chest tightness, shortness of breath and wheezing.    Cardiovascular: Negative for chest pain, palpitations and leg swelling.   Gastrointestinal: Negative for abdominal distention, abdominal pain, anal bleeding, blood in stool, constipation, diarrhea, nausea and vomiting.   Endocrine: Negative for cold intolerance, heat intolerance, polydipsia, polyphagia and polyuria.   Genitourinary: Negative for dysuria, flank pain, frequency, hematuria and urgency.   Musculoskeletal: Negative for arthralgias, back pain, gait problem, joint swelling, myalgias, neck pain and neck stiffness.   Neurological: Negative for dizziness, tremors, seizures, weakness, light-headedness and numbness.   Hematological: Negative for adenopathy. Does not bruise/bleed easily.   Psychiatric/Behavioral: The patient is nervous/anxious.           Blood pressure 120/86, pulse 67, temperature 97.9 °F (36.6 °C), resp. rate 16, height 5' 9" (1.753 m), weight 85.3 kg (188 lb), SpO2 98 %. Body mass index is 27.76 kg/m².   Objective:      Physical Exam  Vitals signs reviewed.   Constitutional:       General: He is not in acute distress.     Appearance: Normal appearance. He is well-developed and normal weight. He is not ill-appearing or toxic-appearing.   HENT:      Head: Normocephalic and atraumatic.      Right Ear: External ear normal.      Left Ear: External ear normal.      Nose: Nose normal. No congestion or rhinorrhea.      Mouth/Throat:      Mouth: Mucous membranes are moist.      Pharynx: Oropharynx is clear. No oropharyngeal exudate or posterior oropharyngeal erythema.   Eyes: "      General:         Right eye: No discharge.         Left eye: No discharge.      Conjunctiva/sclera: Conjunctivae normal.      Pupils: Pupils are equal, round, and reactive to light.   Cardiovascular:      Rate and Rhythm: Normal rate and regular rhythm.      Heart sounds: Normal heart sounds. No murmur.   Pulmonary:      Effort: Pulmonary effort is normal.      Breath sounds: Normal breath sounds.   Skin:     General: Skin is warm and dry.      Capillary Refill: Capillary refill takes less than 2 seconds.   Neurological:      Mental Status: He is alert and oriented to person, place, and time.   Psychiatric:         Mood and Affect: Mood normal.         Thought Content: Thought content normal.         Judgment: Judgment normal.             Assessment:       1. Chronic fatigue    2. Anxiety         Plan:           Chronic fatigue  -     Cancel: HIV 1/2 Ag/Ab (4th Gen); Future; Expected date: 12/31/2020  -     Cancel: Hepatitis C Antibody; Future; Expected date: 12/31/2020  -     Cortisol; Future; Expected date: 12/31/2020  -     Rheumatoid Factor; Future; Expected date: 12/31/2020  -     JADEN Screen w/Reflex; Future; Expected date: 12/31/2020  -     C-Reactive Protein; Future; Expected date: 12/31/2020  -     Sedimentation rate; Future; Expected date: 12/31/2020  -     CK; Future; Expected date: 12/31/2020  -     H. pylori antibody, IgA; Future; Expected date: 12/31/2020  -     Hepatitis C Antibody; Future; Expected date: 12/31/2020  -     HIV 1/2 Ag/Ab (4th Gen); Future; Expected date: 12/31/2020  Will continue workup for his chronic fatigue, will screen for HIV and hepatitis-C, will get cortisol levels look in for adrenal insufficiency, will look for rheumatologic cause like rheumatoid arthritis will get an JADEN screen begin for lupus Sjogren syndrome.  Will get a sedimentation rate and a CK to look for possible inflammatory muscular causes.  Patient has a history of H pylori will screen for H.pylori.      Anxiety  -     sertraline (ZOLOFT) 50 MG tablet; Take 1 tablet (50 mg total) by mouth once daily.  Dispense: 30 tablet; Refill: 11

## 2021-01-06 LAB
CK SERPL-CCNC: 254 U/L (ref 49–439)
CORTIS SERPL-MCNC: 11.7 UG/DL
CRP SERPL-MCNC: <1 MG/L (ref 0–10)
H PYLORI IGA SER-ACNC: 15.1 UNITS (ref 0–8.9)
RHEUMATOID FACT SERPL-ACNC: <10 IU/ML (ref 0–13.9)

## 2021-01-07 ENCOUNTER — TELEPHONE (OUTPATIENT)
Dept: FAMILY MEDICINE | Facility: CLINIC | Age: 36
End: 2021-01-07

## 2021-01-11 ENCOUNTER — TELEPHONE (OUTPATIENT)
Dept: FAMILY MEDICINE | Facility: CLINIC | Age: 36
End: 2021-01-11

## 2021-01-11 RX ORDER — AMOXICILLIN 500 MG/1
1000 CAPSULE ORAL EVERY 12 HOURS
Qty: 56 CAPSULE | Refills: 0 | Status: SHIPPED | OUTPATIENT
Start: 2021-01-11 | End: 2021-01-25

## 2021-01-11 RX ORDER — PHENOL/SODIUM PHENOLATE
20 AEROSOL, SPRAY (ML) MUCOUS MEMBRANE 2 TIMES DAILY
Qty: 28 EACH | Refills: 0 | COMMUNITY
Start: 2021-01-11 | End: 2021-02-02

## 2021-01-11 RX ORDER — CLARITHROMYCIN 500 MG/1
500 TABLET, FILM COATED ORAL EVERY 12 HOURS
Qty: 28 TABLET | Refills: 0 | Status: SHIPPED | OUTPATIENT
Start: 2021-01-11 | End: 2021-01-25

## 2021-02-02 ENCOUNTER — OFFICE VISIT (OUTPATIENT)
Dept: FAMILY MEDICINE | Facility: CLINIC | Age: 36
End: 2021-02-02
Payer: COMMERCIAL

## 2021-02-02 VITALS
HEIGHT: 69 IN | HEART RATE: 72 BPM | RESPIRATION RATE: 17 BRPM | BODY MASS INDEX: 27.31 KG/M2 | TEMPERATURE: 98 F | DIASTOLIC BLOOD PRESSURE: 84 MMHG | SYSTOLIC BLOOD PRESSURE: 136 MMHG | OXYGEN SATURATION: 98 % | WEIGHT: 184.38 LBS

## 2021-02-02 DIAGNOSIS — A04.8 H. PYLORI INFECTION: Primary | ICD-10-CM

## 2021-02-02 DIAGNOSIS — Z11.59 ENCOUNTER FOR HEPATITIS C SCREENING TEST FOR LOW RISK PATIENT: ICD-10-CM

## 2021-02-02 DIAGNOSIS — R53.83 FATIGUE, UNSPECIFIED TYPE: ICD-10-CM

## 2021-02-02 DIAGNOSIS — Z11.4 ENCOUNTER FOR SCREENING FOR HUMAN IMMUNODEFICIENCY VIRUS (HIV): ICD-10-CM

## 2021-02-02 PROCEDURE — 99213 PR OFFICE/OUTPT VISIT, EST, LEVL III, 20-29 MIN: ICD-10-PCS | Mod: S$GLB,,, | Performed by: FAMILY MEDICINE

## 2021-02-02 PROCEDURE — 3008F BODY MASS INDEX DOCD: CPT | Mod: S$GLB,,, | Performed by: FAMILY MEDICINE

## 2021-02-02 PROCEDURE — 1126F PR PAIN SEVERITY QUANTIFIED, NO PAIN PRESENT: ICD-10-PCS | Mod: S$GLB,,, | Performed by: FAMILY MEDICINE

## 2021-02-02 PROCEDURE — 3008F PR BODY MASS INDEX (BMI) DOCUMENTED: ICD-10-PCS | Mod: S$GLB,,, | Performed by: FAMILY MEDICINE

## 2021-02-02 PROCEDURE — 99213 OFFICE O/P EST LOW 20 MIN: CPT | Mod: S$GLB,,, | Performed by: FAMILY MEDICINE

## 2021-02-02 PROCEDURE — 1126F AMNT PAIN NOTED NONE PRSNT: CPT | Mod: S$GLB,,, | Performed by: FAMILY MEDICINE

## 2021-04-29 ENCOUNTER — PATIENT MESSAGE (OUTPATIENT)
Dept: RESEARCH | Facility: HOSPITAL | Age: 36
End: 2021-04-29

## 2022-01-21 ENCOUNTER — HOSPITAL ENCOUNTER (EMERGENCY)
Facility: HOSPITAL | Age: 37
Discharge: HOME | End: 2022-01-21
Attending: EMERGENCY MEDICINE
Payer: MEDICAID

## 2022-01-21 VITALS
TEMPERATURE: 98 F | OXYGEN SATURATION: 100 % | HEART RATE: 68 BPM | BODY MASS INDEX: 27.4 KG/M2 | SYSTOLIC BLOOD PRESSURE: 148 MMHG | WEIGHT: 185 LBS | HEIGHT: 69 IN | RESPIRATION RATE: 18 BRPM | DIASTOLIC BLOOD PRESSURE: 82 MMHG

## 2022-01-21 DIAGNOSIS — S60.052A CONTUSION OF LEFT LITTLE FINGER WITHOUT DAMAGE TO NAIL, INITIAL ENCOUNTER: ICD-10-CM

## 2022-01-21 DIAGNOSIS — S60.10XA SUBUNGUAL HEMATOMA OF FINGER OF LEFT HAND, INITIAL ENCOUNTER: Primary | ICD-10-CM

## 2022-01-21 PROCEDURE — 99283 EMERGENCY DEPT VISIT LOW MDM: CPT

## 2022-04-14 ENCOUNTER — HOSPITAL ENCOUNTER (EMERGENCY)
Facility: HOSPITAL | Age: 37
Discharge: HOME | End: 2022-04-14
Attending: EMERGENCY MEDICINE
Payer: MEDICAID

## 2022-04-14 VITALS
RESPIRATION RATE: 16 BRPM | HEIGHT: 69 IN | BODY MASS INDEX: 28.73 KG/M2 | DIASTOLIC BLOOD PRESSURE: 88 MMHG | SYSTOLIC BLOOD PRESSURE: 130 MMHG | OXYGEN SATURATION: 98 % | TEMPERATURE: 98 F | WEIGHT: 194 LBS | HEART RATE: 68 BPM

## 2022-04-14 DIAGNOSIS — H61.21 IMPACTED CERUMEN OF RIGHT EAR: Primary | ICD-10-CM

## 2022-04-14 PROCEDURE — 99283 EMERGENCY DEPT VISIT LOW MDM: CPT

## 2022-04-14 PROCEDURE — 25000003 PHARM REV CODE 250: Performed by: EMERGENCY MEDICINE

## 2022-04-14 RX ORDER — NEOMYCIN SULFATE, POLYMYXIN B SULFATE, HYDROCORTISONE 3.5; 10000; 1 MG/ML; [USP'U]/ML; MG/ML
4 SOLUTION/ DROPS AURICULAR (OTIC)
Status: COMPLETED | OUTPATIENT
Start: 2022-04-14 | End: 2022-04-14

## 2022-04-14 RX ADMIN — NEOMYCIN SULFATE, POLYMYXIN B SULFATE, HYDROCORTISONE 4 DROP: 3.5; 10000; 1 SOLUTION/ DROPS AURICULAR (OTIC) at 11:04

## 2022-04-15 NOTE — ED PROVIDER NOTES
Encounter Date: 4/14/2022       History     Chief Complaint   Patient presents with    Foreign Body in Ear     Ear wax impacted in right ear, pt states he was cleaning his ear last night and ear wax got stuck in ear     Patient here reported foreign body in his right ear states that he had cerumen impacted in the right ear he saw his primary doctor who recommended he use some wax is all vein drops he states he attempted to use it with a flush with had increasing discomfort with the right ear there is no drainage noted to the ear the noted left ear pain no antecedent illness        Review of patient's allergies indicates:  No Known Allergies  Past Medical History:   Diagnosis Date    GERD (gastroesophageal reflux disease)      No past surgical history on file.  Family History   Problem Relation Age of Onset    Heart disease Mother     Heart disease Father     Hyperlipidemia Father     Hypertension Father      Social History     Tobacco Use    Smoking status: Never Smoker    Smokeless tobacco: Never Used   Substance Use Topics    Alcohol use: No    Drug use: No     Review of Systems   HENT: Positive for ear pain. Negative for congestion, ear discharge and rhinorrhea.        Physical Exam     Initial Vitals [04/14/22 2215]   BP Pulse Resp Temp SpO2   (!) 134/97 67 16 98.4 °F (36.9 °C) 98 %      MAP       --         Physical Exam    Constitutional: He appears well-developed and well-nourished. No distress.   HENT:   Head: Normocephalic and atraumatic.   Left Ear: External ear normal.   Mouth/Throat: Oropharynx is clear and moist.   Right ear canal with cerumen impaction with mild erythema posterior to the fact cerumen is no apparent drainage noted he ear canal is not narrowed left ear canal and TM is clear   Eyes: Pupils are equal, round, and reactive to light.   Neck: Neck supple.   Normal range of motion.  Musculoskeletal:      Cervical back: Normal range of motion and neck supple.     Lymphadenopathy:      He has no cervical adenopathy.   Neurological: He is alert and oriented to person, place, and time.   Skin: Skin is warm and dry. Capillary refill takes less than 2 seconds.         ED Course   Procedures  Labs Reviewed - No data to display       Imaging Results    None          Medications   neomycin-polymyxin-hydrocortisone otic solution 4 drop (has no administration in time range)     Medical Decision Making:   ED Management:  Nursing will attempt to remove some cerumen I have prescribed Cortisporin otic drops recommend patient continue to use ears wax softening drops of 45 days prior to attempting any further irrigation will refer patient to ENT return to the ER for any worsened symptoms or new since                      Clinical Impression:   Final diagnoses:  [H61.21] Impacted cerumen of right ear (Primary)          ED Disposition Condition    Discharge Stable        ED Prescriptions     None        Follow-up Information     Follow up With Specialties Details Why Contact Info    Nas Perkins MD Otolaryngology Call  for re-examination of your symptoms [address] [facility] EAR[facility]SE [facility]  [facility] [address]  [phone]             Jairo Prieto MD  04/14/22 0553

## 2022-08-15 ENCOUNTER — HOSPITAL ENCOUNTER (EMERGENCY)
Facility: HOSPITAL | Age: 37
Discharge: HOME | End: 2022-08-15
Attending: EMERGENCY MEDICINE
Payer: MEDICAID

## 2022-08-15 VITALS
DIASTOLIC BLOOD PRESSURE: 71 MMHG | HEIGHT: 69 IN | RESPIRATION RATE: 20 BRPM | WEIGHT: 185 LBS | SYSTOLIC BLOOD PRESSURE: 122 MMHG | TEMPERATURE: 98 F | BODY MASS INDEX: 27.4 KG/M2 | HEART RATE: 67 BPM | OXYGEN SATURATION: 97 %

## 2022-08-15 DIAGNOSIS — R25.2 MUSCLE CRAMPING: ICD-10-CM

## 2022-08-15 DIAGNOSIS — T67.2XXA HEAT CRAMP, INITIAL ENCOUNTER: Primary | ICD-10-CM

## 2022-08-15 LAB
ALBUMIN SERPL BCP-MCNC: 4.2 G/DL (ref 3.5–5.2)
ALP SERPL-CCNC: 54 U/L (ref 55–135)
ALT SERPL W/O P-5'-P-CCNC: 31 U/L (ref 10–44)
AMPHET+METHAMPHET UR QL: NEGATIVE
ANION GAP SERPL CALC-SCNC: 10 MMOL/L (ref 8–16)
AST SERPL-CCNC: 26 U/L (ref 10–40)
BARBITURATES UR QL SCN>200 NG/ML: NEGATIVE
BASOPHILS # BLD AUTO: 0.01 K/UL (ref 0–0.2)
BASOPHILS NFR BLD: 0.2 % (ref 0–1.9)
BENZODIAZ UR QL SCN>200 NG/ML: NEGATIVE
BILIRUB SERPL-MCNC: 1 MG/DL (ref 0.1–1)
BILIRUB UR QL STRIP: NEGATIVE
BUN SERPL-MCNC: 14 MG/DL (ref 6–20)
BZE UR QL SCN: NEGATIVE
CALCIUM SERPL-MCNC: 8.7 MG/DL (ref 8.7–10.5)
CANNABINOIDS UR QL SCN: NEGATIVE
CHLORIDE SERPL-SCNC: 97 MMOL/L (ref 95–110)
CK SERPL-CCNC: 102 U/L (ref 20–200)
CLARITY UR: CLEAR
CO2 SERPL-SCNC: 26 MMOL/L (ref 23–29)
COLOR UR: YELLOW
CREAT SERPL-MCNC: 0.9 MG/DL (ref 0.5–1.4)
CREAT UR-MCNC: 245 MG/DL (ref 23–375)
DIFFERENTIAL METHOD: ABNORMAL
EOSINOPHIL # BLD AUTO: 0 K/UL (ref 0–0.5)
EOSINOPHIL NFR BLD: 0.4 % (ref 0–8)
ERYTHROCYTE [DISTWIDTH] IN BLOOD BY AUTOMATED COUNT: 11.9 % (ref 11.5–14.5)
EST. GFR  (NO RACE VARIABLE): >60 ML/MIN/1.73 M^2
GLUCOSE SERPL-MCNC: 85 MG/DL (ref 70–110)
GLUCOSE UR QL STRIP: NEGATIVE
HCT VFR BLD AUTO: 42.2 % (ref 40–54)
HGB BLD-MCNC: 14.6 G/DL (ref 14–18)
HGB UR QL STRIP: NEGATIVE
IMM GRANULOCYTES # BLD AUTO: 0 K/UL (ref 0–0.04)
IMM GRANULOCYTES NFR BLD AUTO: 0 % (ref 0–0.5)
KETONES UR QL STRIP: NEGATIVE
LEUKOCYTE ESTERASE UR QL STRIP: NEGATIVE
LYMPHOCYTES # BLD AUTO: 0.8 K/UL (ref 1–4.8)
LYMPHOCYTES NFR BLD: 15.7 % (ref 18–48)
MAGNESIUM SERPL-MCNC: 1.8 MG/DL (ref 1.6–2.6)
MCH RBC QN AUTO: 29.3 PG (ref 27–31)
MCHC RBC AUTO-ENTMCNC: 34.6 G/DL (ref 32–36)
MCV RBC AUTO: 85 FL (ref 82–98)
MONOCYTES # BLD AUTO: 0.3 K/UL (ref 0.3–1)
MONOCYTES NFR BLD: 5.8 % (ref 4–15)
NEUTROPHILS # BLD AUTO: 4 K/UL (ref 1.8–7.7)
NEUTROPHILS NFR BLD: 77.9 % (ref 38–73)
NITRITE UR QL STRIP: NEGATIVE
NRBC BLD-RTO: 0 /100 WBC
OPIATES UR QL SCN: NEGATIVE
PCP UR QL SCN>25 NG/ML: NEGATIVE
PH UR STRIP: 7 [PH] (ref 5–8)
PLATELET # BLD AUTO: 214 K/UL (ref 150–450)
PMV BLD AUTO: 9.1 FL (ref 9.2–12.9)
POTASSIUM SERPL-SCNC: 3.6 MMOL/L (ref 3.5–5.1)
PROT SERPL-MCNC: 7.7 G/DL (ref 6–8.4)
PROT UR QL STRIP: ABNORMAL
RBC # BLD AUTO: 4.99 M/UL (ref 4.6–6.2)
SODIUM SERPL-SCNC: 133 MMOL/L (ref 136–145)
SP GR UR STRIP: 1.02 (ref 1–1.03)
TOXICOLOGY INFORMATION: NORMAL
URN SPEC COLLECT METH UR: ABNORMAL
UROBILINOGEN UR STRIP-ACNC: NEGATIVE EU/DL
WBC # BLD AUTO: 5.16 K/UL (ref 3.9–12.7)

## 2022-08-15 PROCEDURE — 96360 HYDRATION IV INFUSION INIT: CPT

## 2022-08-15 PROCEDURE — 93010 EKG 12-LEAD: ICD-10-PCS | Mod: ,,, | Performed by: INTERNAL MEDICINE

## 2022-08-15 PROCEDURE — 99284 EMERGENCY DEPT VISIT MOD MDM: CPT | Mod: 25

## 2022-08-15 PROCEDURE — 93005 ELECTROCARDIOGRAM TRACING: CPT | Performed by: INTERNAL MEDICINE

## 2022-08-15 PROCEDURE — 82550 ASSAY OF CK (CPK): CPT | Performed by: NURSE PRACTITIONER

## 2022-08-15 PROCEDURE — 81003 URINALYSIS AUTO W/O SCOPE: CPT | Mod: 59 | Performed by: NURSE PRACTITIONER

## 2022-08-15 PROCEDURE — 80053 COMPREHEN METABOLIC PANEL: CPT | Performed by: NURSE PRACTITIONER

## 2022-08-15 PROCEDURE — 80307 DRUG TEST PRSMV CHEM ANLYZR: CPT | Performed by: NURSE PRACTITIONER

## 2022-08-15 PROCEDURE — 25000003 PHARM REV CODE 250: Performed by: NURSE PRACTITIONER

## 2022-08-15 PROCEDURE — 85025 COMPLETE CBC W/AUTO DIFF WBC: CPT | Performed by: NURSE PRACTITIONER

## 2022-08-15 PROCEDURE — 83735 ASSAY OF MAGNESIUM: CPT | Performed by: NURSE PRACTITIONER

## 2022-08-15 PROCEDURE — 93010 ELECTROCARDIOGRAM REPORT: CPT | Mod: ,,, | Performed by: INTERNAL MEDICINE

## 2022-08-15 RX ORDER — SODIUM CHLORIDE 9 MG/ML
1000 INJECTION, SOLUTION INTRAVENOUS
Status: DISCONTINUED | OUTPATIENT
Start: 2022-08-15 | End: 2022-08-15

## 2022-08-15 RX ADMIN — SODIUM CHLORIDE 1000 ML: 0.9 INJECTION, SOLUTION INTRAVENOUS at 12:08

## 2022-08-15 NOTE — Clinical Note
"Tera Stephens"  was seen and treated in our emergency department on 8/15/2022.  He may return to work on 08/15/2022.       If you have any questions or concerns, please don't hesitate to call.      Evangelist Carlton MD"

## 2022-08-15 NOTE — ED PROVIDER NOTES
Encounter Date: 8/15/2022       History     Chief Complaint   Patient presents with    Shaking     Shaking and muscle cramping/tightness this morning. Pt reports not drinking much water and working out in the heat.     Cramping     Patient presents complaining of shaking, muscle cramping and tightness after working out in the heat this morning.  Patient states he felt 1st his calves began to cramp and then he began to feel faint.  He did not lose consciousness.  Patient works outside doing window work.  It is in the middle of August and extremely hot outside.        Review of patient's allergies indicates:  No Known Allergies  Past Medical History:   Diagnosis Date    GERD (gastroesophageal reflux disease)      No past surgical history on file.  Family History   Problem Relation Age of Onset    Heart disease Mother     Heart disease Father     Hyperlipidemia Father     Hypertension Father      Social History     Tobacco Use    Smoking status: Never Smoker    Smokeless tobacco: Never Used   Substance Use Topics    Alcohol use: No    Drug use: No     Review of Systems   All other systems reviewed and are negative.      Physical Exam     Initial Vitals [08/15/22 1124]   BP Pulse Resp Temp SpO2   (!) 150/95 68 18 97.5 °F (36.4 °C) 100 %      MAP       --         Physical Exam    Nursing note and vitals reviewed.  Constitutional: He appears well-developed and well-nourished. He is not diaphoretic. No distress.   HENT:   Head: Normocephalic and atraumatic.   Mouth/Throat: Oropharynx is clear and moist.   Eyes: EOM are normal.   Neck: Neck supple.   Normal range of motion.  Cardiovascular: Normal rate, regular rhythm, normal heart sounds and intact distal pulses.   Pulmonary/Chest: Breath sounds normal. No respiratory distress.   Abdominal: Abdomen is soft.   Musculoskeletal:         General: Normal range of motion.      Cervical back: Normal range of motion and neck supple.     Neurological: He is alert and  oriented to person, place, and time. He has normal strength.   Vision-normal  Neglect-normal  Aphasia - normal  Pronator drift - normal  Cerebellum - normal   Skin: Skin is warm and dry.   Psychiatric: He has a normal mood and affect. His behavior is normal. Judgment and thought content normal.         ED Course   Procedures  Labs Reviewed   CBC W/ AUTO DIFFERENTIAL - Abnormal; Notable for the following components:       Result Value    MPV 9.1 (*)     Lymph # 0.8 (*)     Gran % 77.9 (*)     Lymph % 15.7 (*)     All other components within normal limits   COMPREHENSIVE METABOLIC PANEL - Abnormal; Notable for the following components:    Sodium 133 (*)     Alkaline Phosphatase 54 (*)     All other components within normal limits   URINALYSIS, REFLEX TO URINE CULTURE - Abnormal; Notable for the following components:    Protein, UA Trace (*)     All other components within normal limits    Narrative:     Specimen Source->Urine   MAGNESIUM   CK   DRUG SCREEN PANEL, URINE EMERGENCY    Narrative:     Specimen Source->Urine   POCT GLUCOSE MONITORING CONTINUOUS        ECG Results          EKG 12-lead (In process)  Result time 08/15/22 11:52:28    In process by Interface, Lab In [facility] (08/15/22 11:52:28)                 Narrative:    Test Reason : R25.2,    Vent. Rate : 062 BPM     Atrial Rate : 062 BPM     P-R Int : 148 ms          QRS Dur : 104 ms      QT Int : 396 ms       P-R-T Axes : 069 070 020 degrees     QTc Int : 401 ms    Normal sinus rhythm  Normal ECG  No previous ECGs available    Referred By: [email or web address]   SELF           Confirmed By:                             Imaging Results          X-Ray Chest 1 View (Final result)  Result time 08/15/22 12:22:35    Final result by Tanya Harp MD (08/15/22 12:22:35)                 Narrative:    XR CHEST 1 VIEW    CLINICAL HISTORY:  37 years Male shaking and muscle cramping    COMPARISON: None    FINDINGS: Cardiomediastinal silhouette is within normal limits. Lungs  are normally expanded with no airspace consolidation. No pleural effusion or pneumothorax. No acute osseous abnormality.    IMPRESSION: No acute pulmonary process.    Electronically signed by:  Tanya Harp MD  8/15/2022 12:22 PM CDT Workstation: [id number]                               Medications   sodium chloride 0.9% bolus 1,000 mL (1,000 mLs Intravenous New Bag 8/15/22 1206)     Medical Decision Making:   Initial Assessment:   No apparent distress  Differential Diagnosis:   Considerations included not limited to acute kidney injury, dehydration, electrolyte abnormalities, rhabdomyolysis, he related illness  Independently Interpreted Test(s):   I have ordered and independently interpreted EKG Reading(s) - see summary below       <> Summary of EKG Reading(s): EKG is regular rate and rhythm without ST elevation  Clinical Tests:   Lab Tests: Reviewed and Ordered  Radiological Study: Ordered and Reviewed  Medical Tests: Reviewed and Ordered  ED Management:  In the emergency department there is no evidence of electrolyte abnormalities or acute kidney injury.  Patient's other blood work reviewed.  EKG is within normal limits.  Patient was given 1 L normal saline bolus in the emergency department.  Patient is advised follow-up with primary care and to avoid the heat for the rest of the day.  He was given detailed return precautions.  Patient discharged stable condition.                      Clinical Impression:   Final diagnoses:  [R25.2] Muscle cramping  [T67.2XXA] Heat cramp, initial encounter (Primary)          ED Disposition Condition    Discharge Stable        ED Prescriptions     None        Follow-up Information     Follow up With Specialties Details Why Contact Info    Sera Allen NP Family Medicine   [address] [place] [id number] [phone]             Evangelist Carlton MD  08/15/22 6231

## 2023-02-28 NOTE — ED PROVIDER NOTES
Chief complaint:   Chief Complaint   Patient presents with   • Follow-up       Vitals:  Visit Vitals  /78 (BP Location: RLE - Right lower extremity, Patient Position: Sitting, Cuff Size: Large Adult)   Pulse 88   Temp 98 °F (36.7 °C) (Oral)   Resp 18   Ht 5' 11\" (1.803 m)   Wt (!) 180.5 kg (398 lb)   SpO2 97%   BMI 55.51 kg/m²       HISTORY OF PRESENT ILLNESS     HPI  Patient presents for follow-up his chronic medical issues as outlined below.  He tells me he has not been taking Wellbutrin or Trulicity as they were not delivered to him.  He has been going to the gym 30 minutes per day and trying to be more healthy.  He denies any adverse effects from his current regimen.  Other significant problems:  Patient Active Problem List    Diagnosis Date Noted   • Health maintenance examination 02/17/2018     Priority: Medium     2/2018  Tdap: 2016  Flu vaccine not obtained.  HPV series 2 of 3 obtained.     • Hypothyroidism 08/15/2022     Priority: Low   • Type 2 diabetes mellitus without complication, without long-term current use of insulin (CMS/[facility]) 08/15/2022     Priority: Low   • Morbid obesity with BMI of 50.0-59.9, adult (CMS/[facility]) 05/27/2022     Priority: Low   • Vitamin D deficiency 02/18/2018     Priority: Low   • Pure hypercholesterolemia 02/18/2018     Priority: Low   • Elevated transaminase level 02/18/2018     Priority: Low   • Congenital hypothyroidism without goiter 09/04/2014     Priority: Low   • Morbid obesity with BMI of 40.0-44.9, adult (CMS/[facility]) 09/04/2014     Priority: Low       PAST MEDICAL, FAMILY AND SOCIAL HISTORY     Medications:  Current Outpatient Medications   Medication Sig Dispense Refill   • rosuvastatin (CRESTOR) 20 MG tablet Take 1 tablet by mouth daily. 90 tablet 3   • empagliflozin (Jardiance) 25 MG tablet Take 1 tablet by mouth daily (before breakfast). 90 tablet 3   • dulaglutide (Trulicity) 0.75 MG/0.5ML pen-injector Inject 0.75 mg into the skin every 7 days. 2 mL 1   • Insulin  Encounter Date: 1/21/2022       History     Chief Complaint   Patient presents with    Hand Pain     L pinky finger pain after attempting to catch a pump for car and got pinky caught between pump and concrete      Tera Antonio is a 36 year old male with pmh GERD presenting to the ED with c/o pain and swelling to the left pinky. He reports dropping a pump for a car lift on the finger today. He has not taken any medication for pain.         Review of patient's allergies indicates:  No Known Allergies  Past Medical History:   Diagnosis Date    GERD (gastroesophageal reflux disease)      No past surgical history on file.  Family History   Problem Relation Age of Onset    Heart disease Mother     Heart disease Father     Hyperlipidemia Father     Hypertension Father      Social History     Tobacco Use    Smoking status: Never Smoker    Smokeless tobacco: Never Used   Substance Use Topics    Alcohol use: No    Drug use: No     Review of Systems   Constitutional: Negative for fever.   HENT: Negative for sore throat.    Respiratory: Negative for shortness of breath.    Cardiovascular: Negative for chest pain.   Gastrointestinal: Negative for nausea.   Genitourinary: Negative for dysuria.   Musculoskeletal: Negative for back pain.        Pain and bruising to left fifth digit     Skin: Negative for rash.   Neurological: Negative for weakness.   Hematological: Does not bruise/bleed easily.       Physical Exam     Initial Vitals [01/21/22 1413]   BP Pulse Resp Temp SpO2   (!) 148/82 68 18 97.5 °F (36.4 °C) 100 %      MAP       --         Physical Exam    Nursing note and vitals reviewed.  Constitutional: He appears well-developed and well-nourished. He is not diaphoretic. No distress.   HENT:   Head: Normocephalic and atraumatic.   Mouth/Throat: Oropharynx is clear and moist.   Eyes: Conjunctivae are normal.   Neck: Neck supple.   Cardiovascular: Normal rate, regular rhythm, normal heart sounds and intact distal  Pen Needle (Pen Needles) 32G X 4 MM Misc Use weekly with injections 100 each 0   • levothyroxine 200 MCG tablet Take 1 tablet by mouth daily. 90 tablet 0   • levothyroxine 50 MCG tablet Take 1 tablet by mouth daily. 90 tablet 0   • Alcohol Swabs Pads Use 1-3 times daily when check ing blood sugar 300 each 3   • blood glucose (FREESTYLE TEST STRIPS) test strip Test blood sugar 1-4 times daily as directed. Diagnosis: Insulin dependent type 2 diabetes. Meter: Freestyle or per pharmacist 300 strip PRN   • Lancets (freestyle) Misc Use 1-4 times daily 300 each PRN   • Blood Glucose Monitoring Suppl w/Device Kit Use 1-4 times daily Dx: Insulin dependent type 2 diabetes 1 kit 0     No current facility-administered medications for this visit.       Allergies:  ALLERGIES:  No Known Allergies    Past Medical  History/Surgeries:  Past Medical History:   Diagnosis Date   • Hypothyroidism, congenital        Past Surgical History:   Procedure Laterality Date   • No past surgeries         Family History:  Family History   Problem Relation Age of Onset   • Hypothyroid Mother    • Hypertension Father        Social History:  Social History     Tobacco Use   • Smoking status: Every Day     Packs/day: 0.25     Years: 4.00     Pack years: 1.00     Types: Cigarettes   • Smokeless tobacco: Never   • Tobacco comments:     4 or 5 cigs per day   Substance Use Topics   • Alcohol use: No     Alcohol/week: 0.0 standard drinks       REVIEW OF SYSTEMS     Review of Systems   Constitutional: Negative for activity change, fatigue and unexpected weight change.   Respiratory: Negative for cough, chest tightness, shortness of breath and wheezing.    Cardiovascular: Negative for chest pain, palpitations and leg swelling.   Gastrointestinal: Negative for nausea and vomiting.   Skin: Negative for color change and pallor.   Neurological: Negative for dizziness, syncope, weakness and light-headedness.       PHYSICAL EXAM     Physical Exam  Constitutional:   pulses. Exam reveals no gallop and no friction rub.    No murmur heard.  Pulmonary/Chest: Breath sounds normal. He has no wheezes. He has no rhonchi. He has no rales.   Musculoskeletal:         General: Normal range of motion.        Hands:       Cervical back: Neck supple.      Comments: Full ROM of left fifth digit     Neurological: He is alert and oriented to person, place, and time.   Skin: No rash noted. No erythema.         ED Course   Procedures  Labs Reviewed - No data to display       Imaging Results          X-Ray Finger 2 or More Views Left (Final result)  Result time 01/21/22 14:47:26    Final result by Bill Mcginnis MD (01/21/22 14:47:26)                 Narrative:    XR FINGERS    CLINICAL HISTORY:  36 years Male pain    COMPARISON: None    FINDINGS: No acute fracture or malalignment of the fifth digit. Joint spaces are maintained. Soft tissues are unremarkable.    IMPRESSION: No acute osseous abnormality.    Electronically signed by:  Bill Mcginnis MD  1/21/2022 2:47 PM CST Workstation: [id number]                               Medications - No data to display        APC / Resident Notes:   The patient initially had a 50% subungual hematoma with a negative xray. Ice applied and the hematoma improved to approximately 25%. Attempted nail trephination with cautery with minimal blood return. Finger splint applied for comfort and patient advised to follow up with PCP as needed. Based on my clinical evaluation, I do not appreciate any immediate, emergent, or life threatening condition or etiology that warrants additional workup today and feel that the patient can be discharged with close follow up care.                    Clinical Impression:   Final diagnoses:  [S60.10XA] Subungual hematoma of finger of left hand, initial encounter (Primary)  [S60.052A] Contusion of left little finger without damage to nail, initial encounter                 Philly Paredes NP  01/21/22 8705          General: He is not in acute distress.     Appearance: He is well-developed. He is not diaphoretic.   HENT:      Head: Normocephalic and atraumatic.   Eyes:      Conjunctiva/sclera: Conjunctivae normal.      Pupils: Pupils are equal, round, and reactive to light.   Cardiovascular:      Rate and Rhythm: Normal rate and regular rhythm.      Heart sounds: Normal heart sounds. No murmur heard.    No friction rub. No gallop.   Pulmonary:      Effort: Pulmonary effort is normal. No respiratory distress.      Breath sounds: Normal breath sounds. No wheezing.   Skin:     General: Skin is warm and dry.         ASSESSMENT/PLAN     1. Type 2 diabetes mellitus without complication, without long-term current use of insulin (CMS/[facility])    2. Hypothyroidism, unspecified type    3. Morbid obesity with BMI of 50.0-59.9, adult (CMS/[facility])    4. Pure hypercholesterolemia        Orders Placed This Encounter   • rosuvastatin (CRESTOR) 20 MG tablet   • empagliflozin (Jardiance) 25 MG tablet   • dulaglutide (Trulicity) 0.75 MG/0.5ML pen-injector   • Insulin Pen Needle (Pen Needles) 32G X 4 MM Misc   • levothyroxine 200 MCG tablet   • levothyroxine 50 MCG tablet       Type 2 diabetes:   stable monitor.     Jardiance 25 mg daily   Trulicity 0.75 mg weekly     Hypothyroidism:   uncontrolled increase Synthroid to 250 mcg daily     Synthroid 250 mcg daily     Morbid obesity:  Patient did not take Wellbutrin will continue current regimen.  He will see if Trulicity can be delivered today.  I commended him on his weight loss.  Follow-up 1 month.     Wellbutrin 150 mg daily     Hyper cholesterolemia:   stable monitor       Crestor 20 mg daily     · Medication side effects, risk and benefits discussed.  Patient is aware of them and is willing to take the medication(s).  Appropriate over the counter medication use or avoidance discussed.  Maximum recommended doses discussed.    The patient indicated understanding of the diagnosis and agreed with the  plan of care. Will return to clinic for any new or worsening symptoms.

## 2024-05-07 ENCOUNTER — HOSPITAL ENCOUNTER (EMERGENCY)
Facility: HOSPITAL | Age: 39
Discharge: HOME | End: 2024-05-07
Attending: STUDENT IN AN ORGANIZED HEALTH CARE EDUCATION/TRAINING PROGRAM
Payer: MEDICAID

## 2024-05-07 VITALS
BODY MASS INDEX: 29.33 KG/M2 | WEIGHT: 198 LBS | HEART RATE: 72 BPM | SYSTOLIC BLOOD PRESSURE: 130 MMHG | OXYGEN SATURATION: 97 % | TEMPERATURE: 98 F | RESPIRATION RATE: 18 BRPM | HEIGHT: 69 IN | DIASTOLIC BLOOD PRESSURE: 78 MMHG

## 2024-05-07 DIAGNOSIS — S60.211A CONTUSION OF RIGHT WRIST, INITIAL ENCOUNTER: Primary | ICD-10-CM

## 2024-05-07 DIAGNOSIS — T14.90XA INJURY: ICD-10-CM

## 2024-05-07 PROCEDURE — 99283 EMERGENCY DEPT VISIT LOW MDM: CPT | Mod: 25

## 2024-05-07 NOTE — FIRST PROVIDER EVALUATION
Emergency Department TeleTriage Encounter Note      CHIEF COMPLAINT    Chief Complaint   Patient presents with    Wrist Injury     Pt says he was working on a  and the handle hit the inside of his R wrist       VITAL SIGNS   Initial Vitals [05/07/24 1608]   BP Pulse Resp Temp SpO2   130/78 72 18 98.1 °F (36.7 °C) 97 %      MAP       --            ALLERGIES    Review of patient's allergies indicates:  No Known Allergies    PROVIDER TRIAGE NOTE  Verbal consent for the teletriage evaluation was given by the patient at the start of the evaluation.  All efforts will be made to maintain patient's privacy during the evaluation.      This is a teletriage evaluation of a 39 y.o. male presenting to the ED with c/o injury to right wrist; accidentally hit with a pressure. Limited physical exam via telehealth: The patient is awake, alert, answering questions appropriately and is not in respiratory distress.  As the Teletriage provider, I performed an initial assessment and ordered appropriate labs and imaging studies, if any, to facilitate the patient's care once placed in the ED. Once a room is available, care and a full evaluation will be completed by an alternate ED provider.  Any additional orders and the final disposition will be determined by that provider.  All imaging and labs will not be followed-up by the Teletriage Team, including myself.          ORDERS  Labs Reviewed - No data to display    ED Orders (720h ago, onward)      Start Ordered     Status Ordering Provider    05/07/24 1626 05/07/24 1625  X-Ray Wrist Complete Right  1 time imaging         Ordered JELLY MACK              Virtual Visit Note: The provider triage portion of this emergency department evaluation and documentation was performed via [employer], a HIPAA-compliant telemedicine application, in concert with a tele-presenter in the room. A face to face patient evaluation with one of my colleagues will occur once the patient is  placed in an emergency department room.      DISCLAIMER: This note was prepared with [employer] voice recognition transcription software. Garbled syntax, mangled pronouns, and other bizarre constructions may be attributed to that software system.

## 2024-05-07 NOTE — DISCHARGE INSTRUCTIONS
Rest, ice, elevate extremity,  Motrin for pain and swelling  Follow-up with orthopedics as directed for definitive care   Return for any concerns

## 2024-05-07 NOTE — Clinical Note
"Tera Stephens"  was seen and treated in our emergency department on 5/7/2024.  He may return to work on 05/09/2024.       If you have any questions or concerns, please don't hesitate to call.      Yaa Amado, RESHMA"

## 2024-05-07 NOTE — ED PROVIDER NOTES
Encounter Date: 5/7/2024       History     Chief Complaint   Patient presents with    Wrist Injury     Pt says he was working on a  and the handle hit the inside of his R wrist     39-year-old male with no significant past medical history presents emergency department with complaint of right wrist pain.  Patient states that he accidentally hit the palmar aspect of his right wrist on the end of a  and has some swelling to the area denies any distal numbness or tingling, pain with flexion or extension of his wrist he denies any fluid from the  entering his arm or hand.    The history is provided by the patient.     Review of patient's allergies indicates:  No Known Allergies  Past Medical History:   Diagnosis Date    GERD (gastroesophageal reflux disease)      No past surgical history on file.  Family History   Problem Relation Name Age of Onset    Heart disease Mother      Heart disease Father      Hyperlipidemia Father      Hypertension Father       Social History     Tobacco Use    Smoking status: Never    Smokeless tobacco: Never   Substance Use Topics    Alcohol use: No    Drug use: No     Review of Systems   Constitutional: Negative.    HENT: Negative.     Respiratory: Negative.     Cardiovascular: Negative.    Musculoskeletal:         Right wrist tenderness    Neurological: Negative.    Psychiatric/Behavioral: Negative.     All other systems reviewed and are negative.      Physical Exam     Initial Vitals [05/07/24 1608]   BP Pulse Resp Temp SpO2   130/78 72 18 98.1 °F (36.7 °C) 97 %      MAP       --         Physical Exam    Nursing note and vitals reviewed.  Constitutional: He appears well-developed and well-nourished.   Musculoskeletal:      Right wrist: Swelling and tenderness present. No bony tenderness, snuff box tenderness or crepitus. Normal pulse.        Hands:       Comments: Patient has some mild swelling to the palmar aspect of the wrist, pulses are  intact, capillary refill is normal no pain with flexion or extension of the wrist.           ED Course   Splint Application    Date/Time: 5/7/2024 5:21 PM    Performed by: Yaa Amado FNP  Authorized by: Sen Piña Jr.,   Location details: right wrist  Supplies used: elastic bandage  Post-procedure: The splinted body part was neurovascularly unchanged following the procedure.  Patient tolerance: Patient tolerated the procedure well with no immediate complications        Labs Reviewed - No data to display       Imaging Results              X-Ray Wrist Complete Right (Final result)  Result time 05/07/24 17:03:38      Final result by Bryant Pierre MD (05/07/24 17:03:38)                   Impression:      No acute radiographic findings identified.      Electronically signed by: Bryant Pierre  Date:    05/07/2024  Time:    17:03               Narrative:    EXAMINATION:  XR WRIST COMPLETE 3 VIEWS RIGHT    CLINICAL HISTORY:  Injury, unspecified, initial encounter    TECHNIQUE:  PA, lateral, and oblique views of the right wrist were performed.    COMPARISON:  None    FINDINGS:  No acute fractures or dislocation.  Mild ulnar negative variance.  Joint spaces otherwise preserved.  No radiopaque foreign body.  No focal soft tissue swelling identified.                                       Medications - No data to display  Medical Decision Making  39-year-old male with no significant past medical history presents emergency department with complaint of right wrist pain.  Patient states that he accidentally hit the palmar aspect of his right wrist on the end of a  and has some swelling to the area denies any distal numbness or tingling, pain with flexion or extension of his wrist he denies any fluid from the  entering his arm or hand.    The history is provided by the patient.     39-year-old male presents emergency department with complaint of tenderness to the palmar aspect of the right  wrist.  Patient states he hit his wrist on the end of a  he denies any fluid being injected into his wrist or hand from the  he has some mild swelling to the area he has no pain with flexion-extension of the wrist with passive resistance, palmaris appears intact he has no snuffbox tenderness distal pulses are normal capillary refill is normal x-ray imaging reveals no acute fracture or dislocation.  Patient will be discharged home, with Ace wrap instructed on RICE, follow up with Orthopedics given return precautions    Amount and/or Complexity of Data Reviewed  External Data Reviewed: notes.  Radiology: ordered. Decision-making details documented in ED Course.    Risk  OTC drugs.                                      Clinical Impression:  Final diagnoses:  [T14.90XA] Injury  [S60.211A] Contusion of right wrist, initial encounter (Primary)          ED Disposition Condition    Discharge Stable          ED Prescriptions    None       Follow-up Information       Follow up With Specialties Details Why Contact Info    Juan Antonio Goodrich II, MD Orthopedic Surgery Schedule an appointment as soon as possible for a visit in 1 week  [address] [place] [address] [address] David [place]ERA [id number]  [phone]               Yaa Amado, [facility]  05/07/24 [id number]